# Patient Record
Sex: MALE | Race: OTHER | Employment: STUDENT | URBAN - METROPOLITAN AREA
[De-identification: names, ages, dates, MRNs, and addresses within clinical notes are randomized per-mention and may not be internally consistent; named-entity substitution may affect disease eponyms.]

---
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
## 2024-09-12 ENCOUNTER — APPOINTMENT (OUTPATIENT)
Dept: GENERAL RADIOLOGY | Facility: HOSPITAL | Age: 19
End: 2024-09-12
Attending: EMERGENCY MEDICINE
Payer: MEDICAID

## 2024-09-12 ENCOUNTER — HOSPITAL ENCOUNTER (EMERGENCY)
Facility: HOSPITAL | Age: 19
Discharge: HOME | End: 2024-09-12
Attending: EMERGENCY MEDICINE
Payer: MEDICAID

## 2024-09-12 VITALS
WEIGHT: 245 LBS | SYSTOLIC BLOOD PRESSURE: 125 MMHG | HEIGHT: 69 IN | HEART RATE: 82 BPM | BODY MASS INDEX: 36.29 KG/M2 | TEMPERATURE: 98 F | DIASTOLIC BLOOD PRESSURE: 83 MMHG | RESPIRATION RATE: 19 BRPM | OXYGEN SATURATION: 98 %

## 2024-09-12 DIAGNOSIS — J40 BRONCHITIS: Primary | ICD-10-CM

## 2024-09-12 PROCEDURE — 99284 EMERGENCY DEPT VISIT MOD MDM: CPT

## 2024-09-12 PROCEDURE — 71045 X-RAY EXAM CHEST 1 VIEW: CPT | Performed by: EMERGENCY MEDICINE

## 2024-09-12 PROCEDURE — 99283 EMERGENCY DEPT VISIT LOW MDM: CPT

## 2024-09-12 RX ORDER — CETIRIZINE HYDROCHLORIDE 10 MG/1
10 TABLET ORAL DAILY
Qty: 30 TABLET | Refills: 0 | Status: SHIPPED | OUTPATIENT
Start: 2024-09-12 | End: 2024-10-12

## 2024-09-12 RX ORDER — BENZONATATE 100 MG/1
200 CAPSULE ORAL 3 TIMES DAILY PRN
Qty: 21 CAPSULE | Refills: 0 | Status: SHIPPED | OUTPATIENT
Start: 2024-09-12

## 2024-09-12 RX ORDER — METHYLPREDNISOLONE 4 MG
TABLET, DOSE PACK ORAL
Qty: 21 TABLET | Refills: 0 | Status: SHIPPED | OUTPATIENT
Start: 2024-09-12

## 2024-09-12 NOTE — ED INITIAL ASSESSMENT (HPI)
Diagnosed with bronchitis 1 week ago, coughing persists, this early am postussive emesis, recentlty completed 1 week of antibiotics

## 2024-09-12 NOTE — ED PROVIDER NOTES
Patient Seen in: [facility] Emergency Department      History     Chief Complaint   Patient presents with    Nausea/Vomiting/Diarrhea    Cough/URI     Stated Complaint: dx bronchitis a week ago, cough attack tonight, vomiting, brother sts it looked*    Subjective:   HPI    18-year-old male past medical history of asthma depression presents ED with complaints of diagnosis of bronchitis.  Patient reports that he has been using his albuterol inhaler as needed for cough and congestion.  Reports that he finished a Z-Randy but his symptoms have not improved.  Still coughing.  Reports he is coughing so hard tonight that he vomited once.  States that there is some streaks of blood in it.  Denies any further bleeding at this time.  Reports he had nosebleed last week but none this week.  Denies any fever chills body aches.  Complaining of cough some slight sore throat.    Objective:   Past Medical History:    Asthma (HCC)    Depression              History reviewed. No pertinent surgical history.             Social History     Socioeconomic History    Marital status: Single   Tobacco Use    Smoking status: Never    Smokeless tobacco: Never   Vaping Use    Vaping status: Never Used   Substance and Sexual Activity    Alcohol use: Not Currently    Drug use: Not Currently              Review of Systems    Positive for stated Chief Complaint: Nausea/Vomiting/Diarrhea and Cough/URI    Other systems are as noted in HPI.  Constitutional and vital signs reviewed.      All other systems reviewed and negative except as noted above.    Physical Exam     ED Triage Vitals [09/12/24 0343]   /85   Pulse 88   Resp 18   Temp 98.3 °F (36.8 °C)   Temp src Temporal   SpO2 99 %   O2 Device None (Room air)       Current Vitals:   Vital Signs  BP: 125/83  Pulse: 82  Resp: 19  Temp: 98.3 °F (36.8 °C)  Temp src: Temporal  MAP (mmHg): (!) 104    Oxygen Therapy  SpO2: 98 %  O2 Device: None (Room air)            Physical Exam  Vitals and  nursing note reviewed.   Constitutional:       Appearance: He is well-developed.   HENT:      Head: Normocephalic and atraumatic.      Comments: Nasal congestion on examination     Mouth/Throat:      Pharynx: No pharyngeal swelling or oropharyngeal exudate.   Eyes:      Pupils: Pupils are equal, round, and reactive to light.   Cardiovascular:      Rate and Rhythm: Normal rate and regular rhythm.   Pulmonary:      Effort: Pulmonary effort is normal. No respiratory distress.      Comments: Coughing intermittently on examination speaking full sentences without distress  Abdominal:      General: Bowel sounds are normal.      Palpations: Abdomen is soft.   Musculoskeletal:         General: No deformity.      Cervical back: Normal range of motion and neck supple.   Skin:     General: Skin is warm and dry.      Capillary Refill: Capillary refill takes less than 2 seconds.   Neurological:      Mental Status: He is alert and oriented to person, place, and time.               ED Course   Labs Reviewed - No data to display                       MDM      This is a 18-year-old male presents ED with complaints of cough congestion.  Vital signs stable arrival patient appears nontoxic examination lung sounds clear mild coughing on examination.  Heart sounds are normal.  Mild nasal congestion on examination.  Chest x-ray clear no mediastinal widening effusions or infiltrates from independent evaluation of the chest x-ray.  Discussed abortive care with continue albuterol inhaler Medrol Dosepak Tessalon Perles for cough close follow-up strict return precaution instructed.  Patient understand the plan and is in agreement plan discharged home stable condition.                                   Medical Decision Making      Disposition and Plan     Clinical Impression:  1. Bronchitis         Disposition:  Discharge  9/12/2024  4:56 am    Follow-up:  Rena Elise DO  [address]  [address] [id number] [phone]    Call  in 1 day(s)            Medications Prescribed:  Current Discharge Medication List        START taking these medications    Details   methylPREDNISolone 4 MG Oral Tablet Therapy Pack Take as directed on dose pack instructions  Qty: 21 tablet, Refills: 0      benzonatate (TESSALON PERLES) 100 MG Oral Cap Take 2 capsules (200 mg total) by mouth 3 (three) times daily as needed for cough.  Qty: 21 capsule, Refills: 0      cetirizine 10 MG Oral Tab Take 1 tablet (10 mg total) by mouth daily.  Qty: 30 tablet, Refills: 0

## 2024-09-12 NOTE — DISCHARGE INSTRUCTIONS
Please follow-up with your primary care physician 1-2 days return to the ER if your symptoms worsen progress or if you have any further concerns.  Please start the Medrol Dosepak which is a steroid to help with your bronchitis.  Take benzonatate as prescribed for cough.  Take cetirizine as prescribed for nasal congestion.

## (undated) NOTE — ED AVS SNAPSHOT
Triston Dear   MRN: [id number]    Department:  [facility] Emergency Department   Date of Visit:  9/13/2019           Disclosure     Insurance plans vary and the physician(s) referred by the ER may not be covered by your plan.  Please contact your tell this physician (or your personal doctor if your instructions are to return to your personal doctor) about any new or lasting problems. The primary care or specialist physician will see patients referred from the [facility] Emergency Department.  Stiven Handy

## (undated) NOTE — ED AVS SNAPSHOT
Luis Denver   MRN: [id number]    Department:  [facility] Emergency Department   Date of Visit:  12/28/2019           Disclosure     Insurance plans vary and the physician(s) referred by the ER may not be covered by your plan.  Please contact you tell this physician (or your personal doctor if your instructions are to return to your personal doctor) about any new or lasting problems. The primary care or specialist physician will see patients referred from the [facility] Emergency Department.  Salvadore Skiff

## (undated) NOTE — LETTER
Date & Time: 5/12/2023, 4:36 AM  Patient: Richard Crowley  Encounter Provider(s):    Ebony Amaya DO       To Whom It May Concern:    Richard Crowley was seen and treated in our department on 5/11/2023. He can return to school on Monday 5/15/2023.     If you have any questions or concerns, please do not hesitate to call.        _____________________________  Physician/APC Signature

## (undated) NOTE — ED AVS SNAPSHOT
Dylon Gardner   MRN: [id number]    Department:  [facility] Emergency Department   Date of Visit:  11/12/2019           Disclosure     Insurance plans vary and the physician(s) referred by the ER may not be covered by your plan.  Please contact you tell this physician (or your personal doctor if your instructions are to return to your personal doctor) about any new or lasting problems. The primary care or specialist physician will see patients referred from the [facility] Emergency Department.  Carmenza Chew

## (undated) NOTE — Clinical Note
I had the pleasure of seeing Dora Brizuela on 5/24/2023. Please see my attached note.   Juan Yip MD FACS [facility]Sur[facility]

## (undated) NOTE — LETTER
Date & Time: 11/12/2019, 4:52 PM  Patient: Fern Portillo  Encounter Provider(s):    Girtha Epley, MD       To Whom It May Concern:    Fern Portillo was seen and treated in our department on 11/12/2019.  He should not participate in gym/sports until